# Patient Record
Sex: FEMALE | Race: WHITE | Employment: UNEMPLOYED | URBAN - NONMETROPOLITAN AREA
[De-identification: names, ages, dates, MRNs, and addresses within clinical notes are randomized per-mention and may not be internally consistent; named-entity substitution may affect disease eponyms.]

---

## 2021-07-16 ENCOUNTER — OFFICE VISIT (OUTPATIENT)
Dept: NEUROSURGERY | Age: 51
End: 2021-07-16
Payer: MEDICARE

## 2021-07-16 VITALS — HEIGHT: 66 IN | WEIGHT: 171 LBS | BODY MASS INDEX: 27.48 KG/M2 | TEMPERATURE: 97.2 F

## 2021-07-16 DIAGNOSIS — M54.2 NECK PAIN: ICD-10-CM

## 2021-07-16 DIAGNOSIS — M51.36 LUMBAR DEGENERATIVE DISC DISEASE: ICD-10-CM

## 2021-07-16 DIAGNOSIS — Z98.890 HISTORY OF BACK SURGERY: ICD-10-CM

## 2021-07-16 DIAGNOSIS — M47.816 LUMBAR SPONDYLOSIS: ICD-10-CM

## 2021-07-16 DIAGNOSIS — M54.16 LUMBAR RADICULOPATHY: ICD-10-CM

## 2021-07-16 DIAGNOSIS — M54.9 BACK PAIN, UNSPECIFIED BACK LOCATION, UNSPECIFIED BACK PAIN LATERALITY, UNSPECIFIED CHRONICITY: Primary | ICD-10-CM

## 2021-07-16 PROCEDURE — G8427 DOCREV CUR MEDS BY ELIG CLIN: HCPCS | Performed by: NEUROLOGICAL SURGERY

## 2021-07-16 PROCEDURE — 4004F PT TOBACCO SCREEN RCVD TLK: CPT | Performed by: NEUROLOGICAL SURGERY

## 2021-07-16 PROCEDURE — G8419 CALC BMI OUT NRM PARAM NOF/U: HCPCS | Performed by: NEUROLOGICAL SURGERY

## 2021-07-16 PROCEDURE — 99213 OFFICE O/P EST LOW 20 MIN: CPT | Performed by: NEUROLOGICAL SURGERY

## 2021-07-16 ASSESSMENT — ENCOUNTER SYMPTOMS
NAUSEA: 0
CONSTIPATION: 0
BACK PAIN: 0
SHORTNESS OF BREATH: 0
DIARRHEA: 0

## 2021-07-16 NOTE — PROGRESS NOTES
[facility] ([facility]) Physicians  Neurosurgery and Pain [address] [facility]: [phone]  F: [id number]  (1970)    7/16/2021    Referred by No ref. provider found    Subjective:     Shelley Montesinos is 54 y.o. female who complains today of:    Chief Complaint   Patient presents with    Back Pain     She is here for follow up after cervical and lumbar x-rays. Feels worse compared to last visit. [age]-year-old female with a history of L4-5 back surgery by Dr. Daniele Jean in 2004 for left leg pain with persistent left leg numbness into the pinky toe. Denies leg weakness. She complains of lower back pain with radiation up to neck, with occasional left arm numbness. Her worst area of pain is lower back area. She is also here for follow up after cervical spine x-rays for neck and left arm pain. Denies recent fever or chills. No weight gain or weight loss. No bladder or bowel incontinence. She is generally healthy. History of ADHD and PTSD from being incarcerated. Denies heart problems. She is a smoker. She smokes marijuana occasionally. She drinks about once per month.       Narcotics: None   Conservative treatments: Failed aquatic and physical therapy     Work status: Unemployed, babysitting        Back Pain  Pertinent negatives include no fever or headaches. Allergies:  Patient has no known allergies. Past Medical History:   Diagnosis Date    Arthritis     Bipolar disorder ([facility] [address]     Chronic back pain     Chronic right shoulder pain 3/11/2016    Depression     Restless leg syndrome     Sleep apnea      No past surgical history on file.   Family History   Problem Relation Age of Onset    Arthritis Mother     Diabetes Mother     Diabetes Father      Social History     Socioeconomic History    Marital status: Single     Spouse name: Not on file    Number of children: Not on file    Years of education: Not on file    Highest education level: Not on file   Occupational History    Not on file   Tobacco Use    Smoking status: Current Every Day Smoker    Smokeless tobacco: Never Used   Substance and Sexual Activity    Alcohol use: Not on file    Drug use: Not on file    Sexual activity: Not on file   Other Topics Concern    Not on file   Social History Narrative    Not on file     Social Determinants of Health     Financial Resource Strain:     Difficulty of Paying Living Expenses:    Food Insecurity:     Worried About Running Out of Food in the Last Year:     [address] in the Last Year:    Transportation Needs:     Lack of Transportation (Medical):  Lack of Transportation (Non-Medical):    Physical Activity:     Days of Exercise per Week:     Minutes of Exercise per Session:    Stress:     Feeling of Stress :    Social Connections:     Frequency of Communication with Friends and Family:     Frequency of Social Gatherings with Friends and Family:     Attends Mosque Services:     Active Member of Clubs or Organizations:     Attends Club or Organization Meetings:     Marital Status:    Intimate Partner Violence:     Fear of Current or Ex-Partner:     Emotionally Abused:     Physically Abused:     Sexually Abused:        Current Outpatient Medications on File Prior to Visit   Medication Sig Dispense Refill    gabapentin (NEURONTIN) 100 MG capsule Take 1 capsule by mouth 3 times daily for 30 days.  90 capsule 0    diclofenac (VOLTAREN) 50 MG EC tablet Take 1 tablet by mouth 3 times daily (with meals) 60 tablet 3    oxyCODONE-acetaminophen (PERCOCET)  MG per tablet Take 1 tablet by mouth 2 times daily      amphetamine-dextroamphetamine (ADDERALL) 10 MG tablet Take 10 mg by mouth 2 times daily      meloxicam (MOBIC) 7.5 MG tablet Take 7.5 mg by mouth daily      escitalopram (LEXAPRO) 20 MG tablet Take 20 mg by mouth daily      Prenatal Vit-Fe Fumarate-FA EHL.  Negative bilateral straight leg-raise. No ankle clonus bilaterally. Mother is present for exam.     Psychiatric:         Mood and Affect: Mood normal.           Assessment:      Diagnosis Orders   1. Back pain, unspecified back location, unspecified back pain laterality, unspecified chronicity  MRI LUMBAR SPINE W WO CONTRAST   2. History of back surgery  MRI LUMBAR SPINE W WO CONTRAST   3. Neck pain     4. Lumbar degenerative disc disease  MRI LUMBAR SPINE W WO CONTRAST   5. Lumbar spondylosis  MRI LUMBAR SPINE W WO CONTRAST   6. Lumbar radiculopathy  MRI LUMBAR SPINE W WO CONTRAST       Plan:        Patient returns my office continued follow-up for chief complaint of back pain along with neck pain. History of previous back surgery with continued pain and worsening pain. Failed therapy in both the neck and the back. Clinically unchanged. X-ray reports of cervical spine and lumbar spine was reviewed by me. Impression: Previous lumbar surgery with a mechanical back pain radiculopathy. Patient underwent MRI of lumbar spine with and without contrast with failed conservative treatment. Neck pain with cervical radiculopathy. Patient is recommended to continue with observation while the lumbar spine she has been addressed. Patient is also recommend to be seen by neurosurgeon elsewhere since I will be leaving out of state    Orders Placed This Encounter   Procedures    MRI [facility] [address]     Standing Status:   Future     Standing Expiration Date:   10/14/2021         Follow up:  No follow-ups on file.     Kalyan Ramsey MD

## 2021-07-22 ENCOUNTER — TELEPHONE (OUTPATIENT)
Dept: NEUROSURGERY | Age: 51
End: 2021-07-22

## 2021-07-22 NOTE — TELEPHONE ENCOUNTER
Spoke with patient, [facility] has not contacted her to schedule. She states she has the scheduling number and will call [facility] either tonight or tomorrow morning to schedule.

## 2021-08-02 NOTE — TELEPHONE ENCOUNTER
Requested Prescriptions     Pending Prescriptions Disp Refills    gabapentin (NEURONTIN) 100 MG capsule 90 capsule 0     Sig: Take 1 capsule by mouth 3 times daily for 30 days. Patient last seen on:  7/19  Date of last surgery:  n/a  Date of last refill:  5/21  Pain level:  n/a  Patient complaining of:  Pt asking for refill. She also wanted Dr. Corbin Brink to know that she scheduled her MRI for 8/8  Future appts: none.

## 2021-08-04 RX ORDER — GABAPENTIN 100 MG/1
100 CAPSULE ORAL 3 TIMES DAILY
Qty: 90 CAPSULE | Refills: 0 | Status: SHIPPED | OUTPATIENT
Start: 2021-08-04 | End: 2021-09-03

## 2021-08-08 ENCOUNTER — HOSPITAL ENCOUNTER (OUTPATIENT)
Dept: MRI IMAGING | Age: 51
Discharge: HOME | End: 2021-08-10
Payer: MEDICARE

## 2021-08-08 DIAGNOSIS — M54.9 BACK PAIN, UNSPECIFIED BACK LOCATION, UNSPECIFIED BACK PAIN LATERALITY, UNSPECIFIED CHRONICITY: ICD-10-CM

## 2021-08-08 DIAGNOSIS — Z98.890 HISTORY OF BACK SURGERY: ICD-10-CM

## 2021-08-08 DIAGNOSIS — M54.16 LUMBAR RADICULOPATHY: ICD-10-CM

## 2021-08-08 DIAGNOSIS — M51.36 LUMBAR DEGENERATIVE DISC DISEASE: ICD-10-CM

## 2021-08-08 DIAGNOSIS — M47.816 LUMBAR SPONDYLOSIS: ICD-10-CM

## 2021-08-08 PROCEDURE — 6360000004 HC RX CONTRAST MEDICATION: Performed by: NEUROLOGICAL SURGERY

## 2021-08-08 PROCEDURE — 72158 MRI LUMBAR SPINE W/O & W/DYE: CPT

## 2021-08-08 PROCEDURE — A9579 GAD-BASE MR CONTRAST NOS,1ML: HCPCS | Performed by: NEUROLOGICAL SURGERY

## 2021-08-08 RX ADMIN — GADOTERIDOL 15 ML: 279.3 INJECTION, SOLUTION INTRAVENOUS at 12:13

## 2021-08-11 ENCOUNTER — TELEPHONE (OUTPATIENT)
Dept: NEUROSURGERY | Age: 51
End: 2021-08-11
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
## 2021-08-13 NOTE — TELEPHONE ENCOUNTER
Unfortunately without the disc, I cannot personally review the MRI. Just have the MRI report results, which pt is welcome to obtain copy of MRI report for the results.

## 2023-08-04 ENCOUNTER — HOSPITAL ENCOUNTER (EMERGENCY)
Age: 53
Discharge: HOME | End: 2023-08-04
Payer: MEDICAID

## 2023-08-04 VITALS — HEART RATE: 93 BPM | OXYGEN SATURATION: 99 % | RESPIRATION RATE: 16 BRPM

## 2023-08-04 VITALS — RESPIRATION RATE: 14 BRPM | HEART RATE: 88 BPM

## 2023-08-04 VITALS — BODY MASS INDEX: 32.9 KG/M2

## 2023-08-04 VITALS
DIASTOLIC BLOOD PRESSURE: 90 MMHG | RESPIRATION RATE: 19 BRPM | HEART RATE: 97 BPM | SYSTOLIC BLOOD PRESSURE: 120 MMHG | OXYGEN SATURATION: 97 %

## 2023-08-04 VITALS
OXYGEN SATURATION: 98 % | RESPIRATION RATE: 20 BRPM | HEART RATE: 93 BPM | SYSTOLIC BLOOD PRESSURE: 141 MMHG | DIASTOLIC BLOOD PRESSURE: 96 MMHG

## 2023-08-04 VITALS
RESPIRATION RATE: 15 BRPM | OXYGEN SATURATION: 96 % | HEART RATE: 96 BPM | DIASTOLIC BLOOD PRESSURE: 106 MMHG | SYSTOLIC BLOOD PRESSURE: 157 MMHG

## 2023-08-04 VITALS
HEART RATE: 93 BPM | RESPIRATION RATE: 13 BRPM | DIASTOLIC BLOOD PRESSURE: 108 MMHG | SYSTOLIC BLOOD PRESSURE: 145 MMHG | OXYGEN SATURATION: 96 %

## 2023-08-04 VITALS — HEART RATE: 89 BPM | OXYGEN SATURATION: 99 % | RESPIRATION RATE: 16 BRPM

## 2023-08-04 VITALS — OXYGEN SATURATION: 96 % | RESPIRATION RATE: 15 BRPM | HEART RATE: 93 BPM

## 2023-08-04 VITALS — SYSTOLIC BLOOD PRESSURE: 166 MMHG | HEART RATE: 89 BPM | DIASTOLIC BLOOD PRESSURE: 103 MMHG | RESPIRATION RATE: 20 BRPM

## 2023-08-04 VITALS — HEART RATE: 85 BPM | RESPIRATION RATE: 17 BRPM

## 2023-08-04 VITALS — RESPIRATION RATE: 16 BRPM | OXYGEN SATURATION: 97 % | HEART RATE: 99 BPM

## 2023-08-04 VITALS — DIASTOLIC BLOOD PRESSURE: 104 MMHG | RESPIRATION RATE: 16 BRPM | HEART RATE: 93 BPM | SYSTOLIC BLOOD PRESSURE: 164 MMHG

## 2023-08-04 VITALS
OXYGEN SATURATION: 98 % | SYSTOLIC BLOOD PRESSURE: 143 MMHG | HEART RATE: 99 BPM | DIASTOLIC BLOOD PRESSURE: 111 MMHG | RESPIRATION RATE: 19 BRPM

## 2023-08-04 VITALS — HEART RATE: 96 BPM | OXYGEN SATURATION: 99 % | RESPIRATION RATE: 19 BRPM

## 2023-08-04 VITALS — SYSTOLIC BLOOD PRESSURE: 167 MMHG | HEART RATE: 88 BPM | DIASTOLIC BLOOD PRESSURE: 102 MMHG | RESPIRATION RATE: 15 BRPM

## 2023-08-04 VITALS — RESPIRATION RATE: 14 BRPM | HEART RATE: 92 BPM

## 2023-08-04 VITALS — RESPIRATION RATE: 13 BRPM | HEART RATE: 88 BPM

## 2023-08-04 DIAGNOSIS — S43.402A: Primary | ICD-10-CM

## 2023-08-04 DIAGNOSIS — F17.210: ICD-10-CM
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
## 2023-08-04 PROCEDURE — 70450 CT HEAD/BRAIN W/O DYE: CPT

## 2023-08-04 PROCEDURE — 72125 CT NECK SPINE W/O DYE: CPT

## 2023-08-04 PROCEDURE — 99285 EMERGENCY DEPT VISIT HI MDM: CPT

## 2023-08-04 PROCEDURE — 93005 ELECTROCARDIOGRAM TRACING: CPT

## 2023-08-04 PROCEDURE — 96374 THER/PROPH/DIAG INJ IV PUSH: CPT

## 2023-08-04 PROCEDURE — 73030 X-RAY EXAM OF SHOULDER: CPT

## 2023-08-04 PROCEDURE — 96375 TX/PRO/DX INJ NEW DRUG ADDON: CPT
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
## 2023-08-04 NOTE — ECG_ITS
The [facility] 
                                        
                                       Test Date:    2023 
Pat Name:     Evelyn Brand            Department:    
Patient ID:   [id number]               Room:         - 
Gender:       Female                   Technician:    
:          1970               Requested By:  
Order Number: [id number]              Reading MD:   VIRGIL ZARAGOZA 
                                 Measurements 
Intervals                              Axis           
Rate:         93                       P:            56 
IA:           118                      QRS:          74 
QRSD:         80                       T:            27 
QT:           346                                     
QTc:          397                                     
                           Interpretive Statements 
1100 Sinus rhythm 
2210 Short IA interval 
4068 Nonspecific Twave abnormality 
8102 Low QRS voltage in chest leads 
9150 **  abnormal ECG  ** 
No previous ECG available for comparison 
Electronically Signed On 2023 6:33:39 EDT by VIRGIL ZARAGOZA
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
## 2023-08-04 NOTE — XR_ITS
The [address] [address] 
     [phone] 
  
  
Patient Name: 
JENNIFER ARCE 
  
MRN: [id number]    YOB: 1970    Sex: F 
Assigned Patient Location: ER 
Current Patient Location: ER 
Accession/Order Number: [id number] 
Exam Date: 8/04/2023  15:19    Report Date: 8/04/2023  15:38 
  
At the request of: 
NORA HODGSON   
  
Procedure:  XR shoulder LT min 2V 
  
EXAM: XR shoulder LT min 2V  
  
HISTORY: pain 
  
COMPARISON: None.  
  
TECHNIQUE: 3 views 
  
FINDINGS: Overall bony architecture is normal. There is mild narrowing of the  
acromioclavicular joint. The glenohumeral joint space is satisfactorily  
maintained. Soft tissues are unremarkable. 
  
ORDER #: [id number] XR/XR shoulder LT min 2V  
IMPRESSION:   
   
Early degenerative changes at the acromioclavicular joint.  
No evidence for acute fracture or dislocation.  
   
   
Electronically authenticated by: MILAGROS FERRIS   Date: 8/04/2023  15:38
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
## 2023-12-20 ENCOUNTER — HOSPITAL ENCOUNTER (EMERGENCY)
Age: 53
Discharge: HOME | End: 2023-12-20
Payer: MEDICAID

## 2023-12-20 VITALS
TEMPERATURE: 98.24 F | HEART RATE: 77 BPM | OXYGEN SATURATION: 99 % | RESPIRATION RATE: 18 BRPM | SYSTOLIC BLOOD PRESSURE: 126 MMHG | DIASTOLIC BLOOD PRESSURE: 75 MMHG

## 2023-12-20 VITALS — BODY MASS INDEX: 35.6 KG/M2

## 2023-12-20 DIAGNOSIS — F17.210: ICD-10-CM

## 2023-12-20 DIAGNOSIS — M54.50: ICD-10-CM

## 2023-12-20 DIAGNOSIS — Z79.899: ICD-10-CM

## 2023-12-20 DIAGNOSIS — M54.6: Primary | ICD-10-CM

## 2023-12-20 DIAGNOSIS — G89.29: ICD-10-CM

## 2023-12-20 PROCEDURE — 99284 EMERGENCY DEPT VISIT MOD MDM: CPT

## 2023-12-20 PROCEDURE — 96372 THER/PROPH/DIAG INJ SC/IM: CPT

## 2023-12-20 NOTE — ED_ITS
HPI - General Adult    
General    
Chief complaint: Back Pain/Injury    
Stated complaint: BACK PAIN    
Time Seen by Provider: 12/20/23 11:07    
Source: patient    
Mode of arrival: walk-in    
Limitations: no limitations    
History of Present Illness    
HPI narrative:     
    
Patient is a 53-year-old female who is presenting to the Emergency Room WITH   
ACUTE ON CHRONIC BILATERAL LUMBAR PAIN, and upper thoracic pain bilateral.    
Patient has a neurosurgeon in [place]. Patient has an appointment on December 28   
with her neurosurgeon. Patient has had MRI several weeks ago. Patient has had   
previous surgery on her upper and lower back, she may need additional surgery. A  
she'll boyfriend is at bedside. Patient has baclofen and Lyrica that she is   
taking at home, no pain medication. Patient[facility], she has not spoken to them about pain medication. Patient's pain to   
upper shoulder/thoracic area has gotten worse in the last week, left   
shoulder/left trapezius is hurting more than the right side.      
Patient is here for pain relief.    
.    
All systems are negative except as noted/marked. All systems reviewed and   
otherwise negative.    
.    
Nurses note and vital signs reviewed and patient is not hypoxic.    
    
General:  The patient appears Mild distress secondary to pain.  Patient is   
resting uncomfortably on cart, Laying in a right lateral decubitus position.   
Patient is not toxic, lethargic, or listless    
Skin:  Warm, dry, no pallor noted.  There is no rash noted. No petechiae,   
purpura.    
Head:  Normocephalic, atraumatic    
Eye: Normal conjunctiva, no drainage, EOMI. PERRL    
Ears, Nose, Mouth, and Throat: oral mucosa is moist. Nares patent. Mouth without  
vesicles.     
Cardiovascular:  Regular Rate and Rhythm, no murmur, gallop, rub    
Respiratory:  Patient is in no distress, no accessory muscle use, lungs are   
clear to auscultation, no wheezing, rales or rhonchi    
Back: Patient has moderate tenderness to palpation to bilateral trapezius   
muscles, left hurting little bit more than the right. Patient has mild to   
moderate left shoulder pain with flexion and extension and abduction of left   
shoulder, she's had no acute injury, trauma or fall. No signs of dislocation or   
fracture. Patient has mild to moderate paracervical tenderness to palpation.    
Patient has moderate bilateral paralumbar tenderness palpation, no new midline   
pain, no rash anywhere to the back.  Patient has no signs of saddle anesthesia   
or cauda equina. Otherwise non-tender, no CVA tenderness bilaterally to   
percussion. No CT LS midline pain Besides above mentioned.    
GI:  soft, no tenderness to palpation, no masses appreciated.  No rebound,   
guarding, or rigidity noted. No flank pain bilateral, No distention    
Musculoskeletal: Patient has full range of motion of all of the extremities, no   
motor, sensory, or focal neurological deficits     
Neurological:  A&O x3, normal speech    
Psychiatric:  Cooperative    
Related Data    
                                Home Medications    
    
    
    
 Medication  Instructions  Recorded  Confirmed    
     
aripiprazole 15 mg tablet 15 mg PO DAILY 12/20/23 12/20/23    
     
baclofen 10 mg tablet 10 mg PO Q12H 12/20/23 12/20/23    
     
bupropion HCl 150 mg 24 hr tablet, 150 mg PO DAILY 12/20/23 12/20/23    
    
extended release       
     
dextroamphetamine-amphetamine ER 30 mg PO .dailly 12/20/23 12/20/23    
    
30 mg 24hr capsule,extend release       
     
prazosin 2 mg capsule 2 mg PO Q12H 12/20/23 12/20/23    
     
pregabalin 100 mg capsule 100 mg PO Q12H 12/20/23 12/20/23    
     
tizanidine 4 mg tablet 4 mg PO DAILY 12/20/23 12/20/23    
    
    
                                  Previous Rx's    
    
    
    
 Medication  Instructions  Recorded    
     
oxycodone-acetaminophen 5 mg-325 1 tab PO Q4H PRN pain #10 tabs 12/20/23    
    
mg tablet (Percocet)      
    
    
    
                                    Allergies    
    
    
    
Allergy/AdvReac Type Severity Reaction Status Date / Time    
     
No Known Drug Allergies Allergy   Verified 08/04/23 14:36    
    
    
    
    
PFSH    
PFSH    
Social History    
Smoking status:  Current every day smoker     
    
    
    
Exam    
Constitutional    
Vital Signs, click to edit/add:     
    
                                Last Vital Signs    
    
    
    
Temp  98.2 F   12/20/23 10:20    
     
Pulse  77   12/20/23 10:20    
     
Resp  18   12/20/23 10:20    
     
BP  126/75   12/20/23 10:20    
     
Pulse Ox  99   12/20/23 10:20    
     
O2 Del Method  Room Air  12/20/23 10:20    
    
    
    
    
    
Course    
Vital Signs    
Vital signs:     
    
                                   Vital Signs    
    
    
    
Temperature  98.2 F   12/20/23 10:20    
     
Pulse Rate  77   12/20/23 10:20    
     
Respiratory Rate  18   12/20/23 10:20    
     
Blood Pressure  126/75   12/20/23 10:20    
     
Pulse Oximetry  99   12/20/23 10:20    
     
Oxygen Delivery Method  Room Air  12/20/23 10:20    
    
    
                                            
    
    
    
Temperature  98.2 F   12/20/23 10:20    
     
Pulse Rate  77   12/20/23 10:20    
     
Respiratory Rate  18   12/20/23 10:20    
     
Blood Pressure  126/75   12/20/23 10:20    
     
Pulse Oximetry  99   12/20/23 10:20    
     
Oxygen Delivery Method  Room Air  12/20/23 10:20    
    
    
    
    
    
Medical Decision Making    
MDM Narrative    
Medical decision making narrative:     
Patient was given total, Norflex and a Norco in the Emergency Room. Patient was   
sent home with a short prescription of Percocet. Patient understands we cannot   
treat chronic pain from the Emergency Room. Patient is told that she needs   
additional medication she would have to get that from her PCP. Patient is also   
not taking any anti-inflammatories for unknown reason. Patient will start using   
NSAIDs along with either Tylenol or Percocet along with baclofen Lyrica and   
she'll see her neurologist next week at December 20 appointment. No questions at  
discharge    
    
Discharge Plan    
Discharge    
Chief Complaint: Back Pain/Injury    
    
Clinical Impression:    
 Chronic thoracic back pain, Chronic lumbar pain, Chronic pain    
    
    
Patient Disposition: Home, Self-Care    
    
Time of Disposition Decision: 11:17    
    
Condition: Fair    
    
Prescriptions / Home Meds:    
New    
  oxycodone-acetaminophen [Percocet] 5-325 mg tablet     
   1 tab PO Q4H PRN (Reason: pain) Qty: 10 0RF    
    
No Action    
  aripiprazole 15 mg tablet     
   15 mg PO DAILY     
  baclofen 10 mg tablet     
   10 mg PO Q12H     
  bupropion HCl 150 mg tablet extended release 24 hr     
   150 mg PO DAILY     
  pregabalin 100 mg capsule     
   100 mg PO Q12H     
  prazosin 2 mg capsule     
   2 mg PO Q12H     
  dextroamphetamine-amphetamine 30 mg capsule,extended release 24hr     
   30 mg PO .dailly     
  tizanidine 4 mg tablet     
   4 mg PO DAILY     
    
Instructions:  Chronic Back Pain (DC), Lower Back Exercises (ED)    
    
Additional Instructions:    
Continue to ice 20 minutes on, 20 minutes off.    
See her neurologist next week as scheduled.    
Use anti-inflammatories over-the-counter.  Use either Motrin, Advil, ibuprofen   
or Aleve.    
    
Do not take Norco with Tylenol, take one of the other depending on how severe   
pain is. Both products have Tylenol in it and he can actually take too much   
Tylenol and one day.    
    
Any other needs for additional medication follow-up with her PCP    
    
Stand Alone Forms:  Portal Instructions    
    
Referrals:    
[facility] [Primary Care Provider] - 1 week

## 2023-12-20 NOTE — ED.[email or web address]
HPI - General Adult
General
Chief complaint: Back Pain/Injury
Stated complaint: BACK PAIN
Time Seen by Provider: 12/20/23 11:07
Source: patient
Mode of arrival: walk-in
Limitations: no limitations
History of Present Illness
HPI narrative: 

Patient is a 53-year-old female who is presenting to the Emergency Room WITH ACUTE ON CHRONIC BILATERAL LUMBAR PAIN, and upper thoracic pain bilateral.  Patient has a neurosurgeon in [place]. Patient has an appointment on December 28 with her 
neurosurgeon. Patient has had MRI several weeks ago. Patient has had previous surgery on her upper and lower back, she may need additional surgery. A she'll boyfriend is at bedside. Patient has baclofen and Lyrica that she is taking at home, no pain 
medication. Patient's PCP is at [facility], she has not spoken to them about pain medication. Patient's pain to upper shoulder/thoracic area has gotten worse in the last week, left shoulder/left trapezius is hurting more than the 
right side.  
Patient is here for pain relief.
.
All systems are negative except as noted/marked. All systems reviewed and otherwise negative.
.
Nurses note and vital signs reviewed and patient is not hypoxic.

General:  The patient appears Mild distress secondary to pain.  Patient is resting uncomfortably on cart, Laying in a right lateral decubitus position. Patient is not toxic, lethargic, or listless
Skin:  Warm, dry, no pallor noted.  There is no rash noted. No petechiae, purpura.
Head:  Normocephalic, atraumatic
Eye: Normal conjunctiva, no drainage, EOMI. PERRL
Ears, Nose, Mouth, and Throat: oral mucosa is moist. Nares patent. Mouth without vesicles. 
Cardiovascular:  Regular Rate and Rhythm, no murmur, gallop, rub
Respiratory:  Patient is in no distress, no accessory muscle use, lungs are clear to auscultation, no wheezing, rales or rhonchi
Back: Patient has moderate tenderness to palpation to bilateral trapezius muscles, left hurting little bit more than the right. Patient has mild to moderate left shoulder pain with flexion and extension and abduction of left shoulder, she's had no 
acute injury, trauma or fall. No signs of dislocation or fracture. Patient has mild to moderate paracervical tenderness to palpation.  Patient has moderate bilateral paralumbar tenderness palpation, no new midline pain, no rash anywhere to the back. 
 Patient has no signs of saddle anesthesia or cauda equina. Otherwise non-tender, no CVA tenderness bilaterally to percussion. No CT LS midline pain Besides above mentioned.
GI:  soft, no tenderness to palpation, no masses appreciated.  No rebound, guarding, or rigidity noted. No flank pain bilateral, No distention
Musculoskeletal: Patient has full range of motion of all of the extremities, no motor, sensory, or focal neurological deficits 
Neurological:  A&O x3, normal speech
Psychiatric:  Cooperative
Related Data
Home Medications

 Medication  Instructions  Recorded  Confirmed
aripiprazole 15 mg tablet 15 mg PO DAILY 12/20/23 12/20/23
baclofen 10 mg tablet 10 mg PO Q12H 12/20/23 12/20/23
bupropion HCl 150 mg 24 hr tablet, 150 mg PO DAILY 12/20/23 12/20/23
extended release   
dextroamphetamine-amphetamine ER 30 mg PO .dailly 12/20/23 12/20/23
30 mg 24hr capsule,extend release   
prazosin 2 mg capsule 2 mg PO Q12H 12/20/23 12/20/23
pregabalin 100 mg capsule 100 mg PO Q12H 12/20/23 12/20/23
tizanidine 4 mg tablet 4 mg PO DAILY 12/20/23 12/20/23

Previous Rx's

 Medication  Instructions  Recorded
oxycodone-acetaminophen 5 mg-325 1 tab PO Q4H PRN pain #10 tabs 12/20/23
mg tablet (Percocet)  


Allergies

Allergy/AdvReac Type Severity Reaction Status Date / Time
No Known Drug Allergies Allergy   Verified 08/04/23 14:36



PFSH
PFSH
Social History
Smoking status:  Current every day smoker 



Exam
Constitutional
Vital Signs, click to edit/add: 

Last Vital Signs

Temp  98.2 F   12/20/23 10:20
Pulse  77   12/20/23 10:20
Resp  18   12/20/23 10:20
BP  126/75   12/20/23 10:20
Pulse Ox  99   12/20/23 10:20
O2 Del Method  Room Air  12/20/23 10:20




Course
Vital Signs
Vital signs: 

Vital Signs

Temperature  98.2 F   12/20/23 10:20
Pulse Rate  77   12/20/23 10:20
Respiratory Rate  18   12/20/23 10:20
Blood Pressure  126/75   12/20/23 10:20
Pulse Oximetry  99   12/20/23 10:20
Oxygen Delivery Method  Room Air  12/20/23 10:20



Temperature  98.2 F   12/20/23 10:20
Pulse Rate  77   12/20/23 10:20
Respiratory Rate  18   12/20/23 10:20
Blood Pressure  126/75   12/20/23 10:20
Pulse Oximetry  99   12/20/23 10:20
Oxygen Delivery Method  Room Air  12/20/23 10:20




Medical Decision Making
MDM Narrative
Medical decision making narrative: 
Patient was given total, Norflex and a Norco in the Emergency Room. Patient was sent home with a short prescription of Percocet. Patient understands we cannot treat chronic pain from the Emergency Room. Patient is told that she needs additional 
medication she would have to get that from her PCP. Patient is also not taking any anti-inflammatories for unknown reason. Patient will start using NSAIDs along with either Tylenol or Percocet along with baclofen Lyrica and she'll see her 
neurologist next week at December 20 appointment. No questions at discharge

Discharge Plan
Discharge
Chief Complaint: Back Pain/Injury

Clinical Impression:
 Chronic thoracic back pain, Chronic lumbar pain, Chronic pain


Patient Disposition: Home, Self-Care

Time of Disposition Decision: 11:17

Condition: Fair

Prescriptions / Home Meds:
New
  oxycodone-acetaminophen [Percocet] 5-325 mg tablet 
   1 tab PO Q4H PRN (Reason: pain) Qty: 10 0RF

No Action
  aripiprazole 15 mg tablet 
   15 mg PO DAILY 
  baclofen 10 mg tablet 
   10 mg PO Q12H 
  bupropion HCl 150 mg tablet extended release 24 hr 
   150 mg PO DAILY 
  pregabalin 100 mg capsule 
   100 mg PO Q12H 
  prazosin 2 mg capsule 
   2 mg PO Q12H 
  dextroamphetamine-amphetamine 30 mg capsule,extended release 24hr 
   30 mg PO .dailly 
  tizanidine 4 mg tablet 
   4 mg PO DAILY 

Instructions:  Chronic Back Pain (DC), Lower Back Exercises (ED)

Additional Instructions:
Continue to ice 20 minutes on, 20 minutes off.
See her neurologist next week as scheduled.
Use anti-inflammatories over-the-counter.  Use either Motrin, Advil, ibuprofen or Aleve.

Do not take Norco with Tylenol, take one of the other depending on how severe pain is. Both products have Tylenol in it and he can actually take too much Tylenol and one day.

Any other needs for additional medication follow-up with her PCP

Stand Alone Forms:  Portal Instructions

Referrals:
[facility] [Primary Care Provider] - 1 week

## 2025-07-27 ENCOUNTER — HOSPITAL ENCOUNTER (EMERGENCY)
Age: 55
Discharge: HOME | End: 2025-07-27
Payer: MEDICAID

## 2025-07-27 VITALS
DIASTOLIC BLOOD PRESSURE: 80 MMHG | OXYGEN SATURATION: 98 % | TEMPERATURE: 98.2 F | HEART RATE: 75 BPM | SYSTOLIC BLOOD PRESSURE: 131 MMHG

## 2025-07-27 VITALS — BODY MASS INDEX: 28.2 KG/M2

## 2025-07-27 DIAGNOSIS — F17.200: ICD-10-CM

## 2025-07-27 DIAGNOSIS — K08.409: ICD-10-CM

## 2025-07-27 DIAGNOSIS — K08.89: Primary | ICD-10-CM

## 2025-07-27 LAB
ADD MANUAL DIFF: NO
ANION GAP: 5.7
BASOPHILS # BLD AUTO: 0 10^3/UL (ref 0–0.1)
BASOPHILS NFR BLD AUTO: 0.4 % (ref 0.2–2)
BUN SERPL-MCNC: 15 MG/DL (ref 7–18)
BUN/CREAT SERPL: 17.2
C REACTIVE PROTEIN: 0.64 MG/DL (ref ?–0.5)
CALCIUM: 9.5 MG/DL (ref 8.5–10.1)
CHLORIDE: 106 MMOL/L (ref 98–107)
CO2 BLD-SCNC: 35.9 MMOL/L (ref 21–32)
CREAT SERPL-SCNC: 0.87 MG/DL (ref 0.55–1.02)
EOSINOPHIL # BLD AUTO: 0.1 10^3/UL (ref 0–0.7)
EOSINOPHIL NFR BLD AUTO: 1.4 % (ref 0.9–7)
ERYTHROCYTE [DISTWIDTH] IN BLOOD BY AUTOMATED COUNT: 12.8 % (ref 11–15)
ESTIMATED GFR (AFRICAN AMERICA: >60
ESTIMATED GFR (NON-AFRICAN AME: >60
GLUCOSE SERPLBLD-MCNC: 90 MG/DL (ref 74–106)
HCT VFR BLD CALC: 38.1 % (ref 36–48)
HEMOGLOBIN: 12.6 G/DL (ref 12–16)
IMMATURE GRANULOCYTES ABS AUTO: 0.02 10^3/UL (ref 0–0.03)
IMMATURE GRANULOCYTES PCT AUTO: 0.2 % (ref 0–0.5)
LYMPHOCYTES  ABSOLUTE AUTO: 3.4 10^3/UL (ref 1.2–3.8)
LYMPHOCYTES PERCENT AUTO: 33.5 % (ref 20.5–60)
MCH RBC QN AUTO: 30.7 PG (ref 26.7–34)
MCV RBC: 92.7 FL (ref 81–99)
MEAN CORPUSCULAR HGB CONC: 33.1 G/DL (ref 29.9–35.2)
MEAN PLATELET VOLUME: 9.1 FL (ref 9.5–13.5)
MONOCYTES # BLD AUTO: 0.9 10^3/UL (ref 0.3–0.8)
MONOCYTES NFR BLD AUTO: 8.7 % (ref 1.7–12)
NEUTROPHILS # BLD AUTO: 5.8 10^3/UL (ref 1.4–6.5)
NEUTROPHILS NFR BLD AUTO: 55.8 % (ref 43–75)
PLATELET # BLD: 347 10^3/UL (ref 150–450)
POTASSIUM SERPLBLD-SCNC: 4.6 MMOL/L (ref 3.5–5.1)
RBC # BLD AUTO: 4.11 10^6/UL (ref 4.2–5.4)
SODIUM BLD-SCNC: 143 MMOL/L (ref 136–145)
WBC # BLD: 10.3 10^3/UL (ref 4–11)

## 2025-07-27 PROCEDURE — 96365 THER/PROPH/DIAG IV INF INIT: CPT

## 2025-07-27 PROCEDURE — 80048 BASIC METABOLIC PNL TOTAL CA: CPT

## 2025-07-27 PROCEDURE — 86140 C-REACTIVE PROTEIN: CPT

## 2025-07-27 PROCEDURE — 96375 TX/PRO/DX INJ NEW DRUG ADDON: CPT

## 2025-07-27 PROCEDURE — 36415 COLL VENOUS BLD VENIPUNCTURE: CPT

## 2025-07-27 PROCEDURE — 85025 COMPLETE CBC W/AUTO DIFF WBC: CPT

## 2025-07-27 PROCEDURE — 99284 EMERGENCY DEPT VISIT MOD MDM: CPT
